# Patient Record
Sex: FEMALE | Race: WHITE | NOT HISPANIC OR LATINO | Employment: OTHER | URBAN - NONMETROPOLITAN AREA
[De-identification: names, ages, dates, MRNs, and addresses within clinical notes are randomized per-mention and may not be internally consistent; named-entity substitution may affect disease eponyms.]

---

## 2019-12-25 ENCOUNTER — OFFICE VISIT (OUTPATIENT)
Dept: URGENT CARE | Facility: CLINIC | Age: 79
End: 2019-12-25
Payer: MEDICARE

## 2019-12-25 VITALS
HEART RATE: 76 BPM | OXYGEN SATURATION: 91 % | WEIGHT: 220 LBS | TEMPERATURE: 97.1 F | HEIGHT: 65 IN | RESPIRATION RATE: 16 BRPM | BODY MASS INDEX: 36.65 KG/M2

## 2019-12-25 DIAGNOSIS — J06.9 URI WITH COUGH AND CONGESTION: ICD-10-CM

## 2019-12-25 DIAGNOSIS — J01.40 ACUTE NON-RECURRENT PANSINUSITIS: Primary | ICD-10-CM

## 2019-12-25 DIAGNOSIS — R06.2 WHEEZING: ICD-10-CM

## 2019-12-25 PROCEDURE — 99204 OFFICE O/P NEW MOD 45 MIN: CPT | Performed by: PHYSICIAN ASSISTANT

## 2019-12-25 RX ORDER — ATORVASTATIN CALCIUM 40 MG/1
40 TABLET, FILM COATED ORAL NIGHTLY
COMMUNITY

## 2019-12-25 RX ORDER — AMLODIPINE BESYLATE 2.5 MG/1
2.5 TABLET ORAL DAILY
COMMUNITY

## 2019-12-25 RX ORDER — LOSARTAN POTASSIUM 100 MG/1
100 TABLET ORAL DAILY
COMMUNITY

## 2019-12-25 RX ORDER — DOXYCYCLINE HYCLATE 100 MG
100 TABLET ORAL 2 TIMES DAILY
Qty: 14 TAB | Refills: 0 | Status: SHIPPED | OUTPATIENT
Start: 2019-12-25 | End: 2020-01-30

## 2019-12-25 RX ORDER — DEXTROMETHORPHAN HYDROBROMIDE AND PROMETHAZINE HYDROCHLORIDE 15; 6.25 MG/5ML; MG/5ML
5 SYRUP ORAL EVERY 4 HOURS PRN
Qty: 120 ML | Refills: 0 | Status: SHIPPED | OUTPATIENT
Start: 2019-12-25 | End: 2020-01-30

## 2019-12-25 RX ORDER — TRAZODONE HYDROCHLORIDE 50 MG/1
TABLET ORAL
COMMUNITY
Start: 2019-11-20 | End: 2020-01-30

## 2019-12-25 RX ORDER — METHYLPREDNISOLONE 4 MG/1
TABLET ORAL
Qty: 21 TAB | Refills: 0 | Status: SHIPPED | OUTPATIENT
Start: 2019-12-25 | End: 2020-01-30

## 2019-12-25 NOTE — PROGRESS NOTES
Subjective:      Pt is a 79 y.o. female who presents with Cough            HPI  This is a new problem. PT presents to  clinic today complaining of sore throat, watery eyes, pressure in ears, cough, fatigue, runny nose, post nasal drip, sinus pressure and headache. PT denies CP, SOB, NVD, abdominal pain, joint pain. PT states these symptoms began around 1 week ago. PT states the pain is a 5-6/10, aching in nature and worse at night.  Pt has not taken any RX medications for this condition. The pt's medication list, problem list, and allergies have been evaluated and reviewed during today's visit.    PMH:  Past Medical History:   Diagnosis Date   • Change in bowel habits 7/10/2009   • Chronic obstructive pulmonary disease (COPD) (HCC) 7/10/2009   • Depression 7/10/2009   • Essential hypertension, malignant 7/10/2009   • History of tobacco use 1/3/2004    40 pack year history   • Insomnia 7/10/2009   • Mixed hyperlipidemia 7/10/2009   • Osteoarthritis 7/10/2009   • Unspecified urinary incontinence 7/10/2009       PSH:  Past Surgical History:   Procedure Laterality Date   • ANKLE ORIF  6/3/2004    right   • SEPTAL RECONSTRUCTION      deviated septum repair in the        Fam Hx:  the patient's family history is not pertinent to their current complaint    Family Status   Relation Name Status   • Mo          old age   • Fa          old age       Soc HX:  Social History     Socioeconomic History   • Marital status:      Spouse name: Not on file   • Number of children: Not on file   • Years of education: Not on file   • Highest education level: Not on file   Occupational History   • Not on file   Social Needs   • Financial resource strain: Not on file   • Food insecurity:     Worry: Not on file     Inability: Not on file   • Transportation needs:     Medical: Not on file     Non-medical: Not on file   Tobacco Use   • Smoking status: Former Smoker     Packs/day: 1.00     Years: 40.00     Pack  years: 40.00     Last attempt to quit: 1/3/2004     Years since quitting: 15.9   • Smokeless tobacco: Never Used   Substance and Sexual Activity   • Alcohol use: No   • Drug use: No   • Sexual activity: Never     Partners: Male     Birth control/protection: Post-Menopausal   Lifestyle   • Physical activity:     Days per week: Not on file     Minutes per session: Not on file   • Stress: Not on file   Relationships   • Social connections:     Talks on phone: Not on file     Gets together: Not on file     Attends [employer] service: Not on file     Active member of club or organization: Not on file     Attends meetings of clubs or organizations: Not on file     Relationship status: Not on file   • Intimate partner violence:     Fear of current or ex partner: Not on file     Emotionally abused: Not on file     Physically abused: Not on file     Forced sexual activity: Not on file   Other Topics Concern   •  Service Not Asked   • Blood Transfusions Not Asked   • Caffeine Concern Not Asked   • Occupational Exposure Not Asked   • Hobby Hazards Not Asked   • Sleep Concern Not Asked   • Stress Concern Not Asked   • Weight Concern Not Asked   • Special Diet Not Asked   • Back Care Not Asked   • Exercise No     Comment: due to SOB   • Bike Helmet Not Asked   • Seat Belt Not Asked   • Self-Exams Not Asked   Social History Narrative   • Not on file         Medications:    Current Outpatient Medications:   •  doxycycline (VIBRAMYCIN) 100 MG Tab, Take 1 Tab by mouth 2 times a day., Disp: 14 Tab, Rfl: 0  •  methylPREDNISolone (MEDROL DOSEPAK) 4 MG Tablet Therapy Pack, Follow schedule on package instructions., Disp: 21 Tab, Rfl: 0  •  promethazine-dextromethorphan (PROMETHAZINE-DM) 6.25-15 MG/5ML syrup, Take 5 mL by mouth every four hours as needed for Cough., Disp: 120 mL, Rfl: 0  •  lisinopril (PRINIVIL) 10 MG TABS, Take 10 mg by mouth 2 Times a Day., Disp: , Rfl:   •  ASPIRIN 81 MG PO TABS, Take 81 mg by mouth every day.,  Disp: , Rfl:   •  SERTRALINE  MG PO TABS, Take 1.5 Tabs by mouth every day., Disp: 135 Each, Rfl: 3  •  NABUMETONE 500 MG PO TABS, Take 1 Tab by mouth 2 times a day., Disp: 180 Each, Rfl: 3  •  ADVAIR DISKUS 250-50 MCG/DOSE INH MISC, Inhale 1 Puff by mouth every 12 hours. Advair 250/50, Disp: 3 Each, Rfl: 3  •  traZODone (DESYREL) 50 MG Tab, , Disp: , Rfl:   •  zolpidem (AMBIEN) 10 MG TABS, Take 1 Tab by mouth every bedtime., Disp: 90 Each, Rfl: 3  •  simvastatin (ZOCOR) 80 MG tablet, Take 80 mg by mouth every day., Disp: , Rfl:   •  METOPROLOL SUCCINATE 25 MG PO TB24, Take 25 mg by mouth every day., Disp: , Rfl:       Allergies:  Codeine and Pcn [penicillins]    ROS    Review of Systems   Constitutional: Positive for malaise/fatigue. Negative for fever.   HENT: Positive for congestion and sore throat from postnasal drip with associated sinus pressure and fullness.    Eyes: Negative for blurred vision, double vision and photophobia.   Respiratory: Positive for cough and sputum production. Negative for hemoptysis, shortness of breath and wheezing.    Cardiovascular: Negative for chest pain and palpitations.   Gastrointestinal: Negative for nausea, vomiting, abdominal pain, diarrhea and constipation.   Genitourinary: Negative for dysuria and flank pain.   Musculoskeletal: Negative for joint pain and myalgias.   Skin: Negative for itching and rash.   Neurological: Positive for headaches. Negative for dizziness and tingling.   Endo/Heme/Allergies: Does not bruise/bleed easily.   Psychiatric/Behavioral: Negative for depression. The patient is not nervous/anxious.         Objective:     Temp 36.2 °C (97.1 °F)    Vitals:    12/25/19 1156   Pulse: 76   Resp: 16   Temp: 36.2 °C (97.1 °F)   SpO2: 91%         Physical Exam      Physical Exam   Constitutional: PT is oriented to person, place, and time. PT appears well-developed and well-nourished. No distress.   HENT:   Head: Normocephalic and atraumatic.   Right Ear:  Hearing, tympanic membrane, external ear and ear canal normal.   Left Ear: Hearing, tympanic membrane, external ear and ear canal normal.   Nose: Mucosal edema, rhinorrhea and sinus tenderness present. Right sinus exhibits frontal sinus tenderness. Left sinus exhibits frontal sinus tenderness.   Mouth/Throat: Uvula is midline. Mucous membranes are pale. Posterior oropharyngeal edema and posterior oropharyngeal erythema present. No oropharyngeal exudate.   Eyes: Conjunctivae normal and EOM are normal. Pupils are equal, round, and reactive to light. Right eye exhibits no discharge. Left eye exhibits no discharge.   Neck: Normal range of motion. Neck supple. No thyromegaly present.   Cardiovascular: Normal rate, regular rhythm, normal heart sounds and intact distal pulses.  Exam reveals no gallop and no friction rub.    No murmur heard.  Pulmonary/Chest: Effort normal. No respiratory distress. PT has wheezes. PT has no rales. PT exhibits tenderness.   Abdominal: Soft. Bowel sounds are normal. PT exhibits no distension and no mass. There is no tenderness. There is no rebound and no guarding.   Musculoskeletal: Normal range of motion. PT exhibits no edema and no tenderness.   Lymphadenopathy:     PT has no cervical adenopathy.   Neurological: Pt is alert and oriented to person, place, and time. Pt has normal reflexes. No cranial nerve deficit.   Skin: Skin is warm and dry. No rash noted. No erythema.   Psychiatric: PT has a normal mood and affect. Pt behavior is normal. Judgment and thought content normal.          Assessment/Plan:       1. Acute non-recurrent pansinusitis    - doxycycline (VIBRAMYCIN) 100 MG Tab; Take 1 Tab by mouth 2 times a day.  Dispense: 14 Tab; Refill: 0  - methylPREDNISolone (MEDROL DOSEPAK) 4 MG Tablet Therapy Pack; Follow schedule on package instructions.  Dispense: 21 Tab; Refill: 0    2. URI with cough and congestion    - doxycycline (VIBRAMYCIN) 100 MG Tab; Take 1 Tab by mouth 2 times a day.   Dispense: 14 Tab; Refill: 0  - methylPREDNISolone (MEDROL DOSEPAK) 4 MG Tablet Therapy Pack; Follow schedule on package instructions.  Dispense: 21 Tab; Refill: 0  - promethazine-dextromethorphan (PROMETHAZINE-DM) 6.25-15 MG/5ML syrup; Take 5 mL by mouth every four hours as needed for Cough.  Dispense: 120 mL; Refill: 0    3. Wheezing    - methylPREDNISolone (MEDROL DOSEPAK) 4 MG Tablet Therapy Pack; Follow schedule on package instructions.  Dispense: 21 Tab; Refill: 0      Concern for worsening symptoms of URI which shortly could transition to pneumonia and worsening sinus congestion and infection with powerful cough keeping pt up at night as they must sleep upright to avoid coughing fits.  Diff DX: Bronchitis, Sinusitis, Pneumonia, Influenza, Viral URI, Allergies  Rest, fluids encouraged.  OTC decongestant for congestion/cough  AVS with medical info given.  Pt was in full understanding and agreement with the plan.  Differential diagnosis, natural history, supportive care, and indications for immediate follow-up discussed. All questions answered. Patient agrees with the plan of care.  Follow-up as needed if symptoms worsen or fail to improve to PCP, Urgent care or Emergency Room.

## 2020-01-30 ENCOUNTER — OFFICE VISIT (OUTPATIENT)
Dept: MEDICAL GROUP | Facility: PHYSICIAN GROUP | Age: 80
End: 2020-01-30
Payer: MEDICARE

## 2020-01-30 VITALS
RESPIRATION RATE: 16 BRPM | HEART RATE: 61 BPM | WEIGHT: 222.44 LBS | HEIGHT: 64 IN | BODY MASS INDEX: 37.98 KG/M2 | OXYGEN SATURATION: 95 % | SYSTOLIC BLOOD PRESSURE: 140 MMHG | TEMPERATURE: 97.6 F | DIASTOLIC BLOOD PRESSURE: 80 MMHG

## 2020-01-30 DIAGNOSIS — I10 ESSENTIAL HYPERTENSION: ICD-10-CM

## 2020-01-30 DIAGNOSIS — Z76.89 ENCOUNTER TO ESTABLISH CARE WITH NEW DOCTOR: ICD-10-CM

## 2020-01-30 DIAGNOSIS — M25.551 RIGHT HIP PAIN: ICD-10-CM

## 2020-01-30 PROCEDURE — 99214 OFFICE O/P EST MOD 30 MIN: CPT | Performed by: FAMILY MEDICINE

## 2020-01-30 ASSESSMENT — PATIENT HEALTH QUESTIONNAIRE - PHQ9
5. POOR APPETITE OR OVEREATING: NOT AT ALL
SUM OF ALL RESPONSES TO PHQ QUESTIONS 1-9: 0
4. FEELING TIRED OR HAVING LITTLE ENERGY: NOT AT ALL
9. THOUGHTS THAT YOU WOULD BE BETTER OFF DEAD, OR OF HURTING YOURSELF: NOT AT ALL
SUM OF ALL RESPONSES TO PHQ9 QUESTIONS 1 AND 2: 0
7. TROUBLE CONCENTRATING ON THINGS, SUCH AS READING THE NEWSPAPER OR WATCHING TELEVISION: NOT AT ALL
8. MOVING OR SPEAKING SO SLOWLY THAT OTHER PEOPLE COULD HAVE NOTICED. OR THE OPPOSITE, BEING SO FIGETY OR RESTLESS THAT YOU HAVE BEEN MOVING AROUND A LOT MORE THAN USUAL: NOT AT ALL
1. LITTLE INTEREST OR PLEASURE IN DOING THINGS: NOT AT ALL
2. FEELING DOWN, DEPRESSED, IRRITABLE, OR HOPELESS: NOT AT ALL
6. FEELING BAD ABOUT YOURSELF - OR THAT YOU ARE A FAILURE OR HAVE LET YOURSELF OR YOUR FAMILY DOWN: NOT AL ALL
3. TROUBLE FALLING OR STAYING ASLEEP OR SLEEPING TOO MUCH: NOT AT ALL

## 2020-01-31 NOTE — PROGRESS NOTES
Complaint: Review medical problems     Subjective:     Edel Abreu is a 79 y.o. female here today for     Encounter to establish care with new doctor  Previously followed by Dr. Fleming, lives across the street rom our clinic. Lives alone, does not get out much, no car.    Essential Hypertension, Malignant  Concerned about her BP. On Prinivil 10 mg, Norvasc 2.5 mg, Cozaar 100 mg qd. Does not check at home, wants to get wrist cuff BP apparatus.    Right hip pain  Pain in right groin keeping her from walking without pain. Never had XR.     Recent labs done.    Current medicines (including changes today)  Current Outpatient Medications   Medication Sig Dispense Refill   • amLODIPine (NORVASC) 2.5 MG Tab Take 2.5 mg by mouth every day.     • losartan (COZAAR) 100 MG Tab Take 100 mg by mouth every day.     • atorvastatin (LIPITOR) 40 MG Tab Take 40 mg by mouth every evening.     • zolpidem (AMBIEN) 10 MG TABS Take 1 Tab by mouth every bedtime. 90 Each 3   • lisinopril (PRINIVIL) 10 MG TABS Take 10 mg by mouth 2 Times a Day.     • SERTRALINE  MG PO TABS Take 1.5 Tabs by mouth every day. 135 Each 3   • ADVAIR DISKUS 250-50 MCG/DOSE INH MISC Inhale 1 Puff by mouth every 12 hours. Advair 250/50 3 Each 3     No current facility-administered medications for this visit.      She  has a past medical history of Change in bowel habits (7/10/2009), Chronic obstructive pulmonary disease (COPD) ([facility]) (7/10/2009), Depression (7/10/2009), Essential hypertension, malignant (7/10/2009), History of tobacco use (1/3/2004), Insomnia (7/10/2009), Mixed hyperlipidemia (7/10/2009), Osteoarthritis (7/10/2009), and Unspecified urinary incontinence (7/10/2009).    Health Maintenance:       Allergies: Codeine and Pcn [penicillins]    Current Outpatient Medications Ordered in Epic   Medication Sig Dispense Refill   • amLODIPine (NORVASC) 2.5 MG Tab Take 2.5 mg by mouth every day.     • losartan (COZAAR) 100 MG Tab Take 100 mg by mouth every  day.     • atorvastatin (LIPITOR) 40 MG Tab Take 40 mg by mouth every evening.     • zolpidem (AMBIEN) 10 MG TABS Take 1 Tab by mouth every bedtime. 90 Each 3   • lisinopril (PRINIVIL) 10 MG TABS Take 10 mg by mouth 2 Times a Day.     • SERTRALINE  MG PO TABS Take 1.5 Tabs by mouth every day. 135 Each 3   • ADVAIR DISKUS 250-50 MCG/DOSE INH MISC Inhale 1 Puff by mouth every 12 hours. Advair 250/50 3 Each 3     No current Epic-ordered facility-administered medications on file.        Past Medical History:   Diagnosis Date   • Change in bowel habits 7/10/2009   • Chronic obstructive pulmonary disease (COPD) (HCC) 7/10/2009   • Depression 7/10/2009   • Essential hypertension, malignant 7/10/2009   • History of tobacco use 1/3/2004    40 pack year history   • Insomnia 7/10/2009   • Mixed hyperlipidemia 7/10/2009   • Osteoarthritis 7/10/2009   • Unspecified urinary incontinence 7/10/2009       Past Surgical History:   Procedure Laterality Date   • ANKLE ORIF  6/3/2004    right   • SEPTAL RECONSTRUCTION      deviated septum repair in the        Social History     Tobacco Use   • Smoking status: Former Smoker     Packs/day: 1.00     Years: 40.00     Pack years: 40.00     Last attempt to quit: 1/3/2004     Years since quittin.0   • Smokeless tobacco: Never Used   Substance Use Topics   • Alcohol use: No   • Drug use: No       Social History     Patient does not qualify to have social determinant information on file (likely too young).   Social History Narrative   • Not on file       History reviewed. No pertinent family history.      ROS Positive for pain in right groin, worse weight-bearing.  Patient denies any fever, chills, unintentional weight gain/loss, fatigue, stroke symptoms, dizziness, headache, nasal congestion, sore-throat, cough, heartburn, chest pain, difficulty breathing, abdominal discomfort, diarrhea/constipation, burning with urination or frequency, back pain, skin rashes, depression or  "anxiety.       Objective:     /80 (BP Location: Right arm, Patient Position: Sitting, BP Cuff Size: Adult)   Pulse 61   Temp 36.4 °C (97.6 °F) (Temporal)   Resp 16   Ht 1.626 m (5' 4\")   Wt 100.9 kg (222 lb 7.1 oz)   SpO2 95%  Body mass index is 38.18 kg/m².   Physical Exam:  Constitutional: Alert, no distress.  Respiratory: Unlabored respiratory effort, lungs clear to auscultation, no wheezes, no ronchi.  Cardiovascular: Normal S1, S2, no murmur, no extremity edema.  Hips: decreased ROM internal rotation right side with marked discomfort; left FROM, no discomfort.  Psych: Alert and oriented x3, appropriate affect and mood.        Assessment and Plan:   The following treatment plan was discussed    1. Right hip pain  New problem. Suspect OA.   - DX-HIP-BILATERAL-WITH PELVIS-3/4 VIEWS; Future  - REFERRAL TO ORTHOPEDICS - given tel # [facility] and [facility].    2. Essential hypertension  Controlled. Recommend arm cuff BP apparatus, monitor BP at home, keep -150.        Followup: Return for pre-op clearance.    Please note that this dictation was created using voice recognition software. I have made every reasonable attempt to correct obvious errors, but I expect that there are errors of grammar and possibly content that I did not discover before finalizing the note.           "

## 2020-01-31 NOTE — ASSESSMENT & PLAN NOTE
Concerned about her BP. On Prinivil 10 mg, Norvasc 2.5 mg, Cozaar 100 mg qd. Does not check at home, wants to get wrist cuff BP apparatus.

## 2020-02-12 ENCOUNTER — APPOINTMENT (OUTPATIENT)
Dept: URGENT CARE | Facility: CLINIC | Age: 80
End: 2020-02-12
Payer: MEDICARE

## 2020-02-12 ENCOUNTER — APPOINTMENT (OUTPATIENT)
Dept: RADIOLOGY | Facility: IMAGING CENTER | Age: 80
End: 2020-02-12
Attending: FAMILY MEDICINE
Payer: MEDICARE

## 2020-02-12 DIAGNOSIS — M25.551 RIGHT HIP PAIN: ICD-10-CM

## 2020-02-12 PROCEDURE — 73522 X-RAY EXAM HIPS BI 3-4 VIEWS: CPT | Mod: TC | Performed by: FAMILY MEDICINE

## 2020-03-17 ENCOUNTER — TELEPHONE (OUTPATIENT)
Dept: MEDICAL GROUP | Facility: PHYSICIAN GROUP | Age: 80
End: 2020-03-17

## 2020-03-17 NOTE — TELEPHONE ENCOUNTER
Received call from pt stating that she had an appt to have her hip replaced however, due to recent COVID-19 they cancelled and rescheduled for June, pt is in a lot of pain and is wondering if there is anything you can do for her in the mean time.   Please advise thank you

## 2020-03-18 NOTE — TELEPHONE ENCOUNTER
Called pt and LM letting her know that Dr. FONSECA is recommending that she call her ortho and ask for pain meds through him.   Also asked pt to let us know if she does get meds.   Gave [phone]

## 2020-03-24 PROBLEM — M16.11 PRIMARY OSTEOARTHRITIS OF RIGHT HIP: Status: ACTIVE | Noted: 2020-03-24

## 2020-03-24 PROBLEM — E66.9 OBESITY (BMI 30-39.9): Status: ACTIVE | Noted: 2020-03-24

## 2020-04-11 ENCOUNTER — OFFICE VISIT (OUTPATIENT)
Dept: URGENT CARE | Facility: CLINIC | Age: 80
End: 2020-04-11
Payer: MEDICARE

## 2020-04-11 ENCOUNTER — APPOINTMENT (OUTPATIENT)
Dept: RADIOLOGY | Facility: IMAGING CENTER | Age: 80
End: 2020-04-11
Attending: PHYSICIAN ASSISTANT
Payer: MEDICARE

## 2020-04-11 VITALS
WEIGHT: 215 LBS | HEART RATE: 72 BPM | RESPIRATION RATE: 20 BRPM | OXYGEN SATURATION: 91 % | DIASTOLIC BLOOD PRESSURE: 80 MMHG | SYSTOLIC BLOOD PRESSURE: 122 MMHG | TEMPERATURE: 98.5 F | HEIGHT: 64 IN | BODY MASS INDEX: 36.7 KG/M2

## 2020-04-11 DIAGNOSIS — R05.9 COUGH: ICD-10-CM

## 2020-04-11 DIAGNOSIS — J02.9 SORE THROAT: ICD-10-CM

## 2020-04-11 DIAGNOSIS — J22 LOWER RESPIRATORY INFECTION: Primary | ICD-10-CM

## 2020-04-11 LAB
INT CON NEG: NORMAL
INT CON POS: NORMAL
S PYO AG THROAT QL: NEGATIVE

## 2020-04-11 PROCEDURE — 99214 OFFICE O/P EST MOD 30 MIN: CPT | Performed by: PHYSICIAN ASSISTANT

## 2020-04-11 PROCEDURE — 87880 STREP A ASSAY W/OPTIC: CPT | Performed by: PHYSICIAN ASSISTANT

## 2020-04-11 PROCEDURE — 71046 X-RAY EXAM CHEST 2 VIEWS: CPT | Mod: TC | Performed by: PHYSICIAN ASSISTANT

## 2020-04-11 RX ORDER — DOXYCYCLINE HYCLATE 100 MG
100 TABLET ORAL 2 TIMES DAILY
Qty: 14 TAB | Refills: 0 | Status: SHIPPED | OUTPATIENT
Start: 2020-04-11 | End: 2020-04-18

## 2020-04-11 RX ORDER — BENZONATATE 100 MG/1
100 CAPSULE ORAL 3 TIMES DAILY PRN
Qty: 15 CAP | Refills: 0 | Status: SHIPPED | OUTPATIENT
Start: 2020-04-11 | End: 2020-04-16

## 2020-04-11 RX ORDER — ALBUTEROL SULFATE 90 UG/1
2 AEROSOL, METERED RESPIRATORY (INHALATION) EVERY 6 HOURS PRN
Qty: 8.5 G | Refills: 0 | Status: SHIPPED | OUTPATIENT
Start: 2020-04-11

## 2020-04-11 ASSESSMENT — ENCOUNTER SYMPTOMS
COUGH: 1
DIARRHEA: 0
FEVER: 1
SORE THROAT: 1
VOMITING: 0
SPUTUM PRODUCTION: 1
NAUSEA: 0
CONSTIPATION: 0
MYALGIAS: 0
SINUS PAIN: 1
CHILLS: 1
SHORTNESS OF BREATH: 0
ABDOMINAL PAIN: 0
WHEEZING: 0

## 2020-04-11 ASSESSMENT — COPD QUESTIONNAIRES: COPD: 1

## 2020-04-11 NOTE — PROGRESS NOTES
Subjective:   Edel Abreu is a 79 y.o. female who presents for Cough (Almost a week dry cough , X 3 days chest congestion .)        Cough   This is a new problem. Episode onset: 3 days  The problem has been unchanged. The problem occurs constantly. The cough is non-productive. Associated symptoms include chills, ear congestion, a fever, nasal congestion and a sore throat. Pertinent negatives include no ear pain, myalgias, shortness of breath or wheezing. Risk factors: No known covid 19 exposure.  Treatments tried: musinex, dayquil  Her past medical history is significant for bronchitis, COPD and pneumonia. There is no history of asthma.     Son with URI sx. Pt received flu shot this year.     Review of Systems   Constitutional: Positive for chills and fever. Negative for malaise/fatigue.   HENT: Positive for congestion, sinus pain and sore throat. Negative for ear pain.    Respiratory: Positive for cough and sputum production. Negative for shortness of breath and wheezing.    Gastrointestinal: Negative for abdominal pain, constipation, diarrhea, nausea and vomiting.   Musculoskeletal: Negative for myalgias.   All other systems reviewed and are negative.      PMH:  has a past medical history of Cataract, Change in bowel habits (7/10/2009), Chronic obstructive pulmonary disease (COPD) (HCC) (7/10/2009), Depression (7/10/2009), Essential hypertension, malignant (7/10/2009), History of tobacco use (1/3/2004), Insomnia (7/10/2009), Migraine, Mixed hyperlipidemia (7/10/2009), Osteoarthritis (7/10/2009), Ulcer, stomach peptic, and Unspecified urinary incontinence (7/10/2009).  MEDS:   Current Outpatient Medications:   •  doxycycline (VIBRAMYCIN) 100 MG Tab, Take 1 Tab by mouth 2 times a day for 7 days., Disp: 14 Tab, Rfl: 0  •  albuterol 108 (90 Base) MCG/ACT Aero Soln inhalation aerosol, Inhale 2 Puffs by mouth every 6 hours as needed for Shortness of Breath., Disp: 8.5 g, Rfl: 0  •  benzonatate (TESSALON) 100 MG Cap,  "Take 1 Cap by mouth 3 times a day as needed for Cough for up to 5 days., Disp: 15 Cap, Rfl: 0  •  pantoprazole (PROTONIX) 40 MG Tablet Delayed Response, , Disp: , Rfl:   •  Multiple Vitamins-Minerals (PRESERVISION AREDS 2 PO), Take  by mouth 2 Times a Day., Disp: , Rfl:   •  Cyanocobalamin (VITAMIN B-12) 1000 MCG Tab, Take 1,000 mcg by mouth every day., Disp: , Rfl:   •  amLODIPine (NORVASC) 2.5 MG Tab, Take 2.5 mg by mouth every day., Disp: , Rfl:   •  losartan (COZAAR) 100 MG Tab, Take 100 mg by mouth every day., Disp: , Rfl:   •  atorvastatin (LIPITOR) 40 MG Tab, Take 40 mg by mouth every evening., Disp: , Rfl:   •  zolpidem (AMBIEN) 10 MG TABS, Take 1 Tab by mouth every bedtime., Disp: 90 Each, Rfl: 3  •  lisinopril (PRINIVIL) 10 MG TABS, Take 10 mg by mouth 2 Times a Day., Disp: , Rfl:   •  SERTRALINE  MG PO TABS, Take 1.5 Tabs by mouth every day., Disp: 135 Each, Rfl: 3  •  Calcium Carbonate-Vitamin D (CALCIUM 600+D HIGH POTENCY PO), Take  by mouth every day., Disp: , Rfl:   ALLERGIES:   Allergies   Allergen Reactions   • Norco [Hydrocodone-Acetaminophen] Itching   • Percocet [Oxycodone-Acetaminophen] Itching   • Codeine      headaches   • Pcn [Penicillins] Anaphylaxis     SURGHX:   Past Surgical History:   Procedure Laterality Date   • ANKLE ORIF  6/3/2004    right   • SEPTAL RECONSTRUCTION      deviated septum repair in the 1980's     SOCHX:  reports that she quit smoking about 16 years ago. She has a 40.00 pack-year smoking history. She has never used smokeless tobacco. She reports that she does not drink alcohol or use drugs.  History reviewed. No pertinent family history.     Objective:   /80   Pulse 72   Temp 36.9 °C (98.5 °F) (Temporal)   Resp 20   Ht 1.626 m (5' 4\")   Wt 97.5 kg (215 lb)   SpO2 91%   BMI 36.90 kg/m²     Physical Exam  Vitals signs reviewed.   Constitutional:       General: She is not in acute distress.     Appearance: She is well-developed.   HENT:      Head: " Normocephalic and atraumatic.      Right Ear: External ear normal. Tympanic membrane is erythematous.      Left Ear: External ear normal. Tympanic membrane is erythematous.      Nose: Congestion present.      Mouth/Throat:      Mouth: Mucous membranes are moist.      Pharynx: Oropharynx is clear. Uvula midline.      Tonsils: No tonsillar exudate or tonsillar abscesses.   Eyes:      Conjunctiva/sclera: Conjunctivae normal.      Pupils: Pupils are equal, round, and reactive to light.   Neck:      Musculoskeletal: Normal range of motion and neck supple.      Trachea: No tracheal deviation.   Cardiovascular:      Rate and Rhythm: Normal rate and regular rhythm.   Pulmonary:      Effort: Pulmonary effort is normal. No respiratory distress.      Breath sounds: Normal breath sounds. No wheezing, rhonchi or rales.   Skin:     General: Skin is warm and dry.      Capillary Refill: Capillary refill takes less than 2 seconds.   Neurological:      General: No focal deficit present.      Mental Status: She is alert and oriented to person, place, and time.   Psychiatric:         Mood and Affect: Mood normal.         Behavior: Behavior normal.       Rapid strep: neg       Assessment/Plan:     1. Lower respiratory infection  doxycycline (VIBRAMYCIN) 100 MG Tab    albuterol 108 (90 Base) MCG/ACT Aero Soln inhalation aerosol    REFERRAL TO FOLLOW-UP WITH PRIMARY CARE    benzonatate (TESSALON) 100 MG Cap   2. Cough  DX-CHEST-2 VIEWS    benzonatate (TESSALON) 100 MG Cap   3. Sore throat  POCT Rapid Strep A     Out of abundance of caution this exam was completed with PPE.    Supportive care reviewed.  Continue Mucinex.  URI handout provided. The patient should self quarantine until full resolution of symptoms for 72 hours without the use of antipyretics and at least 7 days have passed since onset of symptoms per CDC guidelines.  Self-isolation handout provided.    Follow-up with primary care provider within 7-10 days, referral placed.   If symptoms worsen or persist patient can return to clinic for reevaluation.  Red flags and emergency room precautions discussed. All side effects of medication discussed including allergic response, GI upset, tendon injury, etc. Patient confirmed understanding of information.    Please note that this dictation was created using voice recognition software. I have made every reasonable attempt to correct obvious errors, but I expect that there are errors of grammar and possibly content that I did not discover before finalizing the note.

## 2020-04-11 NOTE — PATIENT INSTRUCTIONS
"        Upper Respiratory Infection, Adult  Most upper respiratory infections (URIs) are a viral infection of the air passages leading to the lungs. A URI affects the nose, throat, and upper air passages. The most common type of URI is nasopharyngitis and is typically referred to as \"the common cold.\"  URIs run their course and usually go away on their own. Most of the time, a URI does not require medical attention, but sometimes a bacterial infection in the upper airways can follow a viral infection. This is called a secondary infection. Sinus and middle ear infections are common types of secondary upper respiratory infections.  Bacterial pneumonia can also complicate a URI. A URI can worsen asthma and chronic obstructive pulmonary disease (COPD). Sometimes, these complications can require emergency medical care and may be life threatening.  What are the causes?  Almost all URIs are caused by viruses. A virus is a type of germ and can spread from one person to another.  What increases the risk?  You may be at risk for a URI if:  · You smoke.  · You have chronic heart or lung disease.  · You have a weakened defense (immune) system.  · You are very young or very old.  · You have nasal allergies or asthma.  · You work in crowded or poorly ventilated areas.  · You work in health care facilities or schools.  What are the signs or symptoms?  Symptoms typically develop 2-3 days after you come in contact with a cold virus. Most viral URIs last 7-10 days. However, viral URIs from the influenza virus (flu virus) can last 14-18 days and are typically more severe. Symptoms may include:  · Runny or stuffy (congested) nose.  · Sneezing.  · Cough.  · Sore throat.  · Headache.  · Fatigue.  · Fever.  · Loss of appetite.  · Pain in your forehead, behind your eyes, and over your cheekbones (sinus pain).  · Muscle aches.  How is this diagnosed?  Your health care provider may diagnose a URI by:  · Physical exam.  · Tests to check that " your symptoms are not due to another condition such as:  ¨ Strep throat.  ¨ Sinusitis.  ¨ Pneumonia.  ¨ Asthma.  How is this treated?  A URI goes away on its own with time. It cannot be cured with medicines, but medicines may be prescribed or recommended to relieve symptoms. Medicines may help:  · Reduce your fever.  · Reduce your cough.  · Relieve nasal congestion.  Follow these instructions at home:  · Take medicines only as directed by your health care provider.  · Gargle warm saltwater or take cough drops to comfort your throat as directed by your health care provider.  · Use a warm mist humidifier or inhale steam from a shower to increase air moisture. This may make it easier to breathe.  · Drink enough fluid to keep your urine clear or pale yellow.  · Eat soups and other clear broths and maintain good nutrition.  · Rest as needed.  · Return to work when your temperature has returned to normal or as your health care provider advises. You may need to stay home longer to avoid infecting others. You can also use a face mask and careful hand washing to prevent spread of the virus.  · Increase the usage of your inhaler if you have asthma.  · Do not use any tobacco products, including cigarettes, chewing tobacco, or electronic cigarettes. If you need help quitting, ask your health care provider.  How is this prevented?  The best way to protect yourself from getting a cold is to practice good hygiene.  · Avoid oral or hand contact with people with cold symptoms.  · Wash your hands often if contact occurs.  There is no clear evidence that vitamin C, vitamin E, echinacea, or exercise reduces the chance of developing a cold. However, it is always recommended to get plenty of rest, exercise, and practice good nutrition.  Contact a health care provider if:  · You are getting worse rather than better.  · Your symptoms are not controlled by medicine.  · You have chills.  · You have worsening shortness of breath.  · You have  brown or red mucus.  · You have yellow or brown nasal discharge.  · You have pain in your face, especially when you bend forward.  · You have a fever.  · You have swollen neck glands.  · You have pain while swallowing.  · You have white areas in the back of your throat.  Get help right away if:  · You have severe or persistent:  ¨ Headache.  ¨ Ear pain.  ¨ Sinus pain.  ¨ Chest pain.  · You have chronic lung disease and any of the following:  ¨ Wheezing.  ¨ Prolonged cough.  ¨ Coughing up blood.  ¨ A change in your usual mucus.  · You have a stiff neck.  · You have changes in your:  ¨ Vision.  ¨ Hearing.  ¨ Thinking.  ¨ Mood.  This information is not intended to replace advice given to you by your health care provider. Make sure you discuss any questions you have with your health care provider.  Document Released: 06/13/2002 Document Revised: 08/20/2017 Document Reviewed: 03/25/2015  [employer] Patient Education © 2017 [employer]  Self-Isolating at Home  If it is determined that you do not need to be hospitalized and can be isolated at home please follow the follow the prevention steps below as based on CDC guidelines.  Stay home except to get medical care  People who are mildly ill with unconfirmed COVID-19 or have any other infectious respiratory illness are able to isolate at home during their illness. You should restrict activities outside your home, except for getting medical care. Do not go to work, school, or public areas. Avoid using public transportation, ride-sharing, or taxis.  Call ahead before visiting your doctor  If you have a medical appointment, call the healthcare provider and tell them that you have or may have unconfirmed COVID-19 or another possibly contagious respiratory illness. This will help the healthcare provider’s office take steps to keep other people from getting infected or exposed.  Separate yourself from other people and animals in your home  As much as possible, you  should stay in a specific room and away from other people in your home. Also, you should use a separate bathroom, if available.  You should restrict contact with pets and other animals while you are sick, just like you would around other people. When possible, have another member of your household care for your animals while you are sick. If you must care for your pet or be around animals while you are sick, wash your hands before and after you interact with pets.  Wear a facemask  You should wear a facemask when you are around other people (e.g., sharing a room or vehicle) or pets and before you enter a healthcare provider’s office. If you are not able to wear a facemask (for example, because it causes trouble breathing), then people who live with you should not stay in the same room with you, or they should wear a facemask if they enter your room.  Cover your coughs and sneezes  Cover your mouth and nose with a tissue when you cough or sneeze. Throw used tissues in a lined trash can. Immediately wash your hands with soap and water for at least 20 seconds or, if soap and water are not available, clean your hands with an alcohol-based hand  that contains at least 60% alcohol.  Clean your hands often  Wash your hands often with soap and water for at least 20 seconds, especially after blowing your nose, coughing, or sneezing; going to the bathroom; and before eating or preparing food. If soap and water are not readily available, use an alcohol-based hand  with at least 60% alcohol, covering all surfaces of your hands and rubbing them together until they feel dry.  Soap and water are the best option if hands are visibly dirty. Avoid touching your eyes, nose, and mouth with unwashed hands.  Avoid sharing personal household items  You should not share dishes, drinking glasses, cups, eating utensils, towels, or bedding with other people or pets in your home. After using these items, they should be washed  thoroughly with soap and water.  Clean all “high-touch” surfaces everyday  High touch surfaces include counters, tabletops, doorknobs, bathroom fixtures, toilets, phones, keyboards, tablets, and bedside tables. Also, clean any surfaces that may have blood, stool, or body fluids on them. Use a household cleaning spray or wipe, according to the label instructions. Labels contain instructions for safe and effective use of the cleaning product including precautions you should take when applying the product, such as wearing gloves and making sure you have good ventilation during use of the product.  Monitor your symptoms  Seek prompt medical attention if your illness is worsening (e.g., difficulty breathing). Before seeking care, call your healthcare provider and tell them that you have, or are being evaluated for, unconfirmed COVID-19 or another infectious respiratory illness. Put on a facemask before you enter the facility. These steps will help the healthcare provider’s office to keep other people in the office or waiting room from getting infected or exposed. Ask your healthcare provider to call the local or [facility] health department. Persons who are placed under active monitoring or facilitated self-monitoring should follow instructions provided by their local health department or occupational health professionals, as appropriate. When working with your local health department check their available hours.  If you have a medical emergency and need to call 911, notify the dispatch personnel that you have, or are being evaluated for unconfirmed COVID-19 or another infectious respiratory illness. If possible, put on a facemask before emergency medical services arrive.  Discontinuing home isolation  Patients with unconfirmed COVID-19 or other infectious respiratory illnesses should remain under home isolation precautions until the risk of secondary transmission to others is thought to be low. In general that means 72 hours  after fever resolves without the use of fever reducing medications, AND symptoms have improved AND at least 7 days since symptoms first appeared. If you have questions or concerns consult your healthcare providers or your local health department.  Per CDC guidelines, you are not required to provide a healthcare provider’s note to validate your illness or to return to work, as healthcare provider offices and medical facilities may be extremely busy and not able to provide such documentation in a timely way.

## 2020-04-13 ENCOUNTER — TELEPHONE (OUTPATIENT)
Dept: HEALTH INFORMATION MANAGEMENT | Facility: OTHER | Age: 80
End: 2020-04-13

## 2020-04-13 NOTE — TELEPHONE ENCOUNTER
1. Caller Name: Edel                       Call Back Number: cell  [facility] PCP or Specialty Provider: Yes Rickie Jha        2.  Does patient have any active symptoms of respiratory illness (fever OR cough OR shortness of breath OR sore throat)? Yes, the patient reports the following respiratory symptoms: cough, sore throat since last Wednesday. Went to Urgent Care on Saturday, oxygen sat 91% so told patient to use inhaler albuterol, no fever, received prescription for doxycline, helping with sore throat but still have sore throat and lost voice, and still have productive clear cough, no sob.    3.  Does patient have any comoribidities? None   High Blood Pressure, depression, ulcer    4.  Has the patient traveled in the last 14 days OR had any known contact with someone who is suspected or confirmed to have COVID-19?  No.    5. Disposition: Deferred to [facility] Provider    Note routed to [facility] Provider: Provider action needed: Requesting a phone follow up

## 2021-04-12 ENCOUNTER — OFFICE VISIT (OUTPATIENT)
Dept: URGENT CARE | Facility: CLINIC | Age: 81
End: 2021-04-12
Payer: MEDICARE

## 2021-04-12 VITALS
OXYGEN SATURATION: 92 % | DIASTOLIC BLOOD PRESSURE: 86 MMHG | HEART RATE: 65 BPM | TEMPERATURE: 98 F | SYSTOLIC BLOOD PRESSURE: 152 MMHG | WEIGHT: 212.2 LBS | HEIGHT: 65 IN | BODY MASS INDEX: 35.35 KG/M2

## 2021-04-12 DIAGNOSIS — L82.1 SEBORRHEIC KERATOSIS: ICD-10-CM

## 2021-04-12 PROBLEM — G47.10 HYPERSOMNIA WITH SLEEP APNEA: Status: ACTIVE | Noted: 2020-11-20

## 2021-04-12 PROBLEM — M47.817 SPONDYLOSIS OF LUMBOSACRAL SPINE WITHOUT MYELOPATHY: Status: ACTIVE | Noted: 2020-11-20

## 2021-04-12 PROBLEM — K25.5 CHRONIC GASTRIC ULCER WITH PERFORATION (HCC): Status: ACTIVE | Noted: 2020-11-20

## 2021-04-12 PROBLEM — M51.37 DDD (DEGENERATIVE DISC DISEASE), LUMBOSACRAL: Status: ACTIVE | Noted: 2020-11-20

## 2021-04-12 PROBLEM — M15.9 GENERALIZED OSTEOARTHRITIS: Status: ACTIVE | Noted: 2020-11-20

## 2021-04-12 PROBLEM — G47.30 HYPERSOMNIA WITH SLEEP APNEA: Status: ACTIVE | Noted: 2020-11-20

## 2021-04-12 PROCEDURE — 99213 OFFICE O/P EST LOW 20 MIN: CPT | Performed by: FAMILY MEDICINE

## 2021-04-13 NOTE — PROGRESS NOTES
"Chief Complaint   Patient presents with   • Bump     black spot (L) lower back x last night; size of a quarter         HPI:      Pt c/o \"black spot\" on left flank area.   First noticed yesterday.   Area is not itchy or painful.   Lesion does not bleed.   No hx skin cancer        Past Medical History:   Diagnosis Date   • Cataract     Sx 2018   • Change in bowel habits 7/10/2009   • Chronic obstructive pulmonary disease (COPD) (HCC) 7/10/2009   • Depression 7/10/2009   • Essential hypertension, malignant 7/10/2009   • History of tobacco use 1/3/2004    40 pack year history   • Insomnia 7/10/2009   • Migraine    • Mixed hyperlipidemia 7/10/2009   • Osteoarthritis 7/10/2009   • Ulcer, stomach peptic    • Unspecified urinary incontinence 7/10/2009         Review of Systems   Constitutional: Negative for fever, chills and malaise/fatigue.   Eyes: Negative for vision changes, d/c.    Respiratory: Negative for cough and sputum production.    Cardiovascular: Negative for chest pain and palpitations.   Gastrointestinal: Negative for nausea, vomiting, abdominal pain, diarrhea and constipation.   Genitourinary: Negative for dysuria, urgency and frequency.   Skin: Negative for rash or  itching.   Neurological: Negative for dizziness and tingling.   Psychiatric/Behavioral: Negative for depression.   Hematologic/lymphatic - denies bruising or excessive bleeding  All other systems reviewed and are negative.      OBJECTIVE  /86 (BP Location: Left arm, Patient Position: Sitting)   Pulse 65   Temp 36.7 °C (98 °F)   Ht 1.651 m (5' 5\")   Wt 96.3 kg (212 lb 3.2 oz)   SpO2 92%   Gen: Alert and oriented, No apparent distress.   HEENT: WNL.   Neck: WNL   Lungs: Normal effort  Skin:   There is a 3cm ovoid, raised, dark tan, scaly lesion over left flank area.   No erythema or d/c.   CV: Regular rate           A/P:    1. Seborrheic keratosis   Pt reassured that it is unlikely to be malignant, but she desires excisional biopsy.  - " REFERRAL TO DERMATOLOGY

## 2024-07-20 ENCOUNTER — HOSPITAL ENCOUNTER (OUTPATIENT)
Dept: RADIOLOGY | Facility: MEDICAL CENTER | Age: 84
End: 2024-07-20
Payer: MEDICARE

## 2024-07-22 ENCOUNTER — HOSPITAL ENCOUNTER (OUTPATIENT)
Dept: RADIOLOGY | Facility: MEDICAL CENTER | Age: 84
End: 2024-07-22
Payer: MEDICARE